# Patient Record
Sex: MALE | Race: WHITE | NOT HISPANIC OR LATINO | Employment: UNEMPLOYED | URBAN - METROPOLITAN AREA
[De-identification: names, ages, dates, MRNs, and addresses within clinical notes are randomized per-mention and may not be internally consistent; named-entity substitution may affect disease eponyms.]

---
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
## 2024-09-28 ENCOUNTER — OFFICE VISIT (OUTPATIENT)
Dept: URGENT CARE | Facility: URGENT CARE | Age: 19
End: 2024-09-28

## 2024-09-28 VITALS
DIASTOLIC BLOOD PRESSURE: 78 MMHG | BODY MASS INDEX: 24.72 KG/M2 | WEIGHT: 209.8 LBS | RESPIRATION RATE: 16 BRPM | HEART RATE: 69 BPM | SYSTOLIC BLOOD PRESSURE: 133 MMHG | TEMPERATURE: 97.6 F | OXYGEN SATURATION: 98 %

## 2024-09-28 DIAGNOSIS — J02.9 SORE THROAT: Primary | ICD-10-CM

## 2024-09-28 LAB
DEPRECATED S PYO AG THROAT QL EIA: NEGATIVE
GROUP A STREP BY PCR: NOT DETECTED
SARS-COV-2 RNA RESP QL NAA+PROBE: NEGATIVE

## 2024-09-28 PROCEDURE — 87635 SARS-COV-2 COVID-19 AMP PRB: CPT | Performed by: FAMILY MEDICINE

## 2024-09-28 PROCEDURE — 87651 STREP A DNA AMP PROBE: CPT | Performed by: FAMILY MEDICINE

## 2024-09-28 PROCEDURE — 99213 OFFICE O/P EST LOW 20 MIN: CPT | Performed by: FAMILY MEDICINE

## 2024-09-28 NOTE — PROGRESS NOTES
Tutu was seen today for pharyngitis and otalgia.    Diagnoses and all orders for this visit:    Sore throat  -     Streptococcus A Rapid Screen w/Reflex to PCR - Clinic Collect  -     Symptomatic COVID-19 Virus (Coronavirus) by PCR Nose  -     Group A Streptococcus PCR Throat Swab    Initial strep negative.  Advised to isolate until COVID results return.  If COVID/ strep negative and sore throat persists, return for blood work to evaluate for mono.  Discussed symptomatic treatment: Salt water gargles/ either warm/ cool drinks for relief.      Subjective   Tutu Valdez is a 19 year old is presenting for the following health issues:  Sore throat / ear pain      HPI : Tutu Valdez has had sore throat for 6 days.  Now has pain going into left ear.  Sore throat usually gets worse later in day.  No fevers/ chills.  He has some rhinorrhea / congestion off and on.  No known exposures.  No recent COVID testing.    Works in construction.  No know exposures.  No travel.      Review of Systems: No cough / shortness of breath.  No headache.  No joint pain.  No nausea / vomiting / diarrhea.  No rash.    Patient Active Problem List   Diagnosis    Atopic dermatitis    Attention deficit hyperactivity disorder (ADHD)    History of brain disorder    Precocious puberty    Reactive airways dysfunction syndrome (H)     HEENT: Head is atraumatic and/normocephalic.  PERRL.  Conjunctiva clear.  Tympanic membranes difficult to visualize due to soft wax in canals.  No nasal discharge.  Oropharynx is erythematous and moist.  Sinuses nontender.  Neck: Supple.  2+ anterior cervical lymphadenopathy.  No thyromegaly.  Lungs: Clear to auscultation.  No wheezing, retractions, or tachypnea.  Heart: RRR. S1 and S2 normal.  No murmurs, rubs, or gallops.  Neuro: Awake, alert, oriented x 3.  Normal strength and tone.  Normal gait.                Results for orders placed or performed in visit on 09/28/24 (from the past 24 hour(s))    Streptococcus A Rapid Screen w/Reflex to PCR - Clinic Collect    Specimen: Throat; Swab   Result Value Ref Range    Group A Strep antigen Negative Negative           Amarilys Gray MD

## 2024-09-29 NOTE — RESULT ENCOUNTER NOTE
Call patient- COVID and strep tests are both negative.  Follow-up if not improving in a few days or symptoms worsen.